# Patient Record
Sex: FEMALE | Race: WHITE | NOT HISPANIC OR LATINO | URBAN - METROPOLITAN AREA
[De-identification: names, ages, dates, MRNs, and addresses within clinical notes are randomized per-mention and may not be internally consistent; named-entity substitution may affect disease eponyms.]

---

## 2023-05-04 ENCOUNTER — EMERGENCY (EMERGENCY)
Facility: HOSPITAL | Age: 68
LOS: 1 days | Discharge: HOME | End: 2023-05-04
Attending: EMERGENCY MEDICINE | Admitting: EMERGENCY MEDICINE
Payer: SELF-PAY

## 2023-05-04 VITALS
WEIGHT: 198.42 LBS | HEART RATE: 61 BPM | HEIGHT: 68 IN | TEMPERATURE: 98 F | RESPIRATION RATE: 17 BRPM | DIASTOLIC BLOOD PRESSURE: 99 MMHG | OXYGEN SATURATION: 96 % | SYSTOLIC BLOOD PRESSURE: 159 MMHG

## 2023-05-04 VITALS
HEART RATE: 57 BPM | SYSTOLIC BLOOD PRESSURE: 138 MMHG | TEMPERATURE: 99 F | DIASTOLIC BLOOD PRESSURE: 81 MMHG | OXYGEN SATURATION: 96 % | RESPIRATION RATE: 16 BRPM

## 2023-05-04 PROCEDURE — 99283 EMERGENCY DEPT VISIT LOW MDM: CPT

## 2023-05-04 RX ORDER — APIXABAN 2.5 MG/1
1 TABLET, FILM COATED ORAL
Refills: 0 | DISCHARGE

## 2023-05-04 RX ORDER — ROSUVASTATIN CALCIUM 5 MG/1
1 TABLET ORAL
Refills: 0 | DISCHARGE

## 2023-05-04 RX ORDER — ACETAMINOPHEN 500 MG
650 TABLET ORAL ONCE
Refills: 0 | Status: DISCONTINUED | OUTPATIENT
Start: 2023-05-04 | End: 2023-05-08

## 2023-05-04 NOTE — ED PROVIDER NOTE - PHYSICAL EXAMINATION
Gen: Well-developed, well-nourished, NAD, HEENT: NCAT, mmm   Chest: RRR, nl S1 and S2, no m/r/g. Resp: CTAB, no w/r/r  Abd: nl BS, soft, nt/nd. Ext: Warm, dry. Right middle finger paronychia. edema to pad of finger but no tenderness.

## 2023-05-04 NOTE — ED ADULT TRIAGE NOTE - CHIEF COMPLAINT QUOTE
patient walk in from hotel c/o infection to right middle finger; swelling and redness to nailbed x3 days

## 2023-05-04 NOTE — ED PROVIDER NOTE - OBJECTIVE STATEMENT
Patient reports 3 days of right middle finger pain and swelling.  No injury, fever, focal weakness, paresthesias.  Patient works as a .

## 2023-05-04 NOTE — ED PROVIDER NOTE - CLINICAL SUMMARY MEDICAL DECISION MAKING FREE TEXT BOX
Paronychia, drained. Will tx with antibiotics. Return to the ED immediately if getting worse, not improving, or if having any new or troubling symptoms.

## 2023-05-04 NOTE — ED PROVIDER NOTE - NSFOLLOWUPINSTRUCTIONS_ED_ALL_ED_FT
Paronychia  Paronychia is an infection of the skin that surrounds a nail. It usually affects the skin around a fingernail, but it may also occur near a toenail. It often causes pain and swelling around the nail. In some cases, a collection of pus (abscess) can form near or under the nail.    This condition may develop suddenly, or it may develop gradually over a longer period. In most cases, paronychia is not serious, and it will clear up with treatment.    What are the causes?  This condition may be caused by bacteria or a fungus, such as yeast. The bacteria or fungus can enter the body through an opening in the skin, such as a cut or a hangnail, and cause an infection in your fingernail or toenail. Other causes may include:  Recurrent injury to the fingernail or toenail area.  Irritation of the base and sides of the nail (cuticle).  Injury and irritation can result in inflammation, swelling, and thickened skin around the nail.    What increases the risk?  This condition is more likely to develop in people who:  Get their hands wet often, such as those who work as dishwashers, , or housekeepers.  Bite their fingernails or cuticles.  Have underlying skin conditions.  Have hangnails or injured fingertips.  Are exposed to irritants like detergents and other chemicals.  Have diabetes.  What are the signs or symptoms?  Symptoms of this condition include:  Redness and swelling of the skin near the nail.  Tenderness around the nail when you touch the area.  Pus-filled bumps under the cuticle.  Fluid or pus under the nail.  Throbbing pain in the area.  How is this diagnosed?  This condition is diagnosed with a physical exam. In some cases, a sample of pus may be tested to determine what type of bacteria or fungus is causing the condition.    How is this treated?  Treatment depends on the cause and severity of your condition. If your condition is mild, it may clear up on its own in a few days or after soaking in warm water. If needed, treatment may include:  Antibiotic medicine, if your infection is caused by bacteria.  Antifungal medicine, if your infection is caused by a fungus.  A procedure to drain pus from an abscess.  Anti-inflammatory medicine (corticosteroids).  Removal of part of an ingrown toenail.  A bandage (dressing) may be placed over the affected area if an abscess or part of a nail has been removed.    Follow these instructions at home:  Wound care    Keep the affected area clean.  Soak the affected area in warm water if told to do so by your health care provider. You may be told to do this for 20 minutes, 2–3 times a day.  Keep the area dry when you are not soaking it.  Do not try to drain an abscess yourself.  Follow instructions from your health care provider about how to take care of the affected area. Make sure you:  Wash your hands with soap and water for at least 20 seconds before and after you change your dressing. If soap and water are not available, use hand .  Change your dressing as told by your health care provider.  If you had an abscess drained, check the area every day for signs of infection. Check for:  Redness, swelling, or pain.  Fluid or blood.  Warmth.  Pus or a bad smell.  Medicines    A prescription pill bottle with an example of a pill.  Take over-the-counter and prescription medicines only as told by your health care provider.  If you were prescribed an antibiotic medicine, take it as told by your health care provider. Do not stop taking the antibiotic even if you start to feel better.  General instructions    Avoid contact with any skin irritants or allergens.  Do not pick at the affected area.  Keep all follow-up visits as told. This is important.  Prevention    To prevent this condition from happening again:  Wear rubber gloves when washing dishes or doing other tasks that require your hands to get wet.  Wear gloves if your hands might come in contact with  or other chemicals.  Avoid injuring your nails or fingertips.  Do not bite your nails or tear hangnails.  Do not cut your nails very short.  Do not cut your cuticles.  Use clean nail clippers or scissors when trimming nails.  Contact a health care provider if:  Your symptoms get worse or do not improve with treatment.  You have continued or increased fluid, blood, or pus coming from the affected area.  Your affected finger, toe, or joint becomes swollen or difficult to move.  You have a fever or chills.  There is redness spreading away from the affected area.  Summary  Paronychia is an infection of the skin that surrounds a nail. It often causes pain and swelling around the nail. In some cases, a collection of pus (abscess) can form near or under the nail.  This condition may be caused by bacteria or a fungus. These germs can enter the body through an opening in the skin, such as a cut or a hangnail.  If your condition is mild, it may clear up on its own in a few days. If needed, treatment may include medicine or a procedure to drain pus from an abscess.  To prevent this condition from happening again, wear gloves if doing tasks that require your hands to get wet or to come in contact with chemicals. Also avoid injuring your nails or fingertips.  This information is not intended to replace advice given to you by your health care provider. Make sure you discuss any questions you have with your health care provider.    Document Revised: 03/21/2022 Document Reviewed: 03

## 2023-05-04 NOTE — ED PROVIDER NOTE - PATIENT PORTAL LINK FT
Epidural Block  Performed by: JENNIFER DHILLON  Authorized by: JENNIFER DHILLON     Patient Location:  OB  Start Time:  3/25/2019 12:37 AM  End Time:  3/25/2019 12:45 AM  Reason for Block: labor analgesia    patient identified, IV checked, site marked, risks and benefits discussed, surgical consent, monitors and equipment checked, pre-op evaluation, timeout performed and at patient's request    Patient Position:  Sitting  Prep: Betadine, patient draped and sterile technique    Monitoring:  Blood pressure, continuous pulse oximetry and heart rate  Approach:  Midline  Location:  L4-L5  Injection Technique:  INDIO saline  Skin infiltration:  Lidocaine  Strength:  1%  Dose:  3ml  Needle Type:  Tuohy  Needle Gauge:  17 G  Needle Length:  3.5 in  Loss of resistance::  6  Catheter Size:  19 G  Catheter at Skin Depth:  11  Test Dose:  Lidocaine 1.5% with epinephrine 1-to-200,000  Test Dose Result:  Negative        
You can access the FollowMyHealth Patient Portal offered by [facility] by registering at the following website: [email or web address]. By joining [facility]’s FollowMyHealth portal, you will also be able to view your health information using other applications (apps) compatible with our system.

## 2023-05-08 DIAGNOSIS — I48.92 UNSPECIFIED ATRIAL FLUTTER: ICD-10-CM

## 2023-05-08 DIAGNOSIS — Z79.01 LONG TERM (CURRENT) USE OF ANTICOAGULANTS: ICD-10-CM

## 2023-05-08 DIAGNOSIS — E78.00 PURE HYPERCHOLESTEROLEMIA, UNSPECIFIED: ICD-10-CM

## 2023-05-08 DIAGNOSIS — L03.011 CELLULITIS OF RIGHT FINGER: ICD-10-CM

## 2023-05-08 DIAGNOSIS — M79.644 PAIN IN RIGHT FINGER(S): ICD-10-CM

## 2024-09-25 NOTE — ED ADULT TRIAGE NOTE - IDEAL BODY WEIGHT(KG)
[FreeTextEntry1] : 53 y/o F with venous insufficiency and LE venous ulcers, on unna boot therapy. The patient has been noncompliant with Unna boot therapy.  In her legs are improved.  I recommend reapplying Unna boot therapy to the right lower extremity.  Left lower extremity Ace wrap compression daily  F/u in one week.      63